# Patient Record
Sex: MALE | Race: WHITE | NOT HISPANIC OR LATINO | URBAN - METROPOLITAN AREA
[De-identification: names, ages, dates, MRNs, and addresses within clinical notes are randomized per-mention and may not be internally consistent; named-entity substitution may affect disease eponyms.]

---

## 2018-06-04 ENCOUNTER — EMERGENCY (EMERGENCY)
Facility: HOSPITAL | Age: 23
LOS: 0 days | Discharge: HOME | End: 2018-06-05
Attending: EMERGENCY MEDICINE | Admitting: EMERGENCY MEDICINE

## 2018-06-04 VITALS
SYSTOLIC BLOOD PRESSURE: 136 MMHG | RESPIRATION RATE: 18 BRPM | OXYGEN SATURATION: 99 % | TEMPERATURE: 97 F | HEART RATE: 95 BPM | HEIGHT: 67 IN | WEIGHT: 160.06 LBS | DIASTOLIC BLOOD PRESSURE: 80 MMHG

## 2018-06-04 DIAGNOSIS — S61.213A LACERATION WITHOUT FOREIGN BODY OF LEFT MIDDLE FINGER WITHOUT DAMAGE TO NAIL, INITIAL ENCOUNTER: ICD-10-CM
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
## 2018-06-04 DIAGNOSIS — Z79.899 OTHER LONG TERM (CURRENT) DRUG THERAPY: ICD-10-CM

## 2018-06-04 DIAGNOSIS — S61.211A LACERATION WITHOUT FOREIGN BODY OF LEFT INDEX FINGER WITHOUT DAMAGE TO NAIL, INITIAL ENCOUNTER: ICD-10-CM
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
## 2018-06-04 RX ORDER — SERTRALINE 25 MG/1
0 TABLET, FILM COATED ORAL
Qty: 0 | Refills: 0 | COMMUNITY

## 2018-06-04 RX ORDER — CEPHALEXIN 500 MG
500 CAPSULE ORAL ONCE
Qty: 0 | Refills: 0 | Status: COMPLETED | OUTPATIENT
Start: 2018-06-04 | End: 2018-06-04

## 2018-06-04 NOTE — ED PROVIDER NOTE - MEDICAL DECISION MAKING DETAILS
pt with finger lacerations.  Pt is UTD with tetanus.  pt given abx.  pt dc with outpatient follow up.  lacerations repaired.  Motor/sensation intact.

## 2018-06-04 NOTE — ED PROVIDER NOTE - OBJECTIVE STATEMENT
21 yo M with no significant PMHx up to date with tetanus presents to the ED c/o laceration to left 2nd and 3rd fingers. Pt was at work today using  and accidentally cut his fingers. Pt washed hands and cleaned with alcohol immediately. Pt denies numbness, weakness, and other areas of injury.

## 2018-06-04 NOTE — ED PROVIDER NOTE - PHYSICAL EXAMINATION
VITAL SIGNS: I have reviewed nursing notes and confirm.  CONSTITUTIONAL: Well-developed; well-nourished; in no acute distress.  SKIN: Skin exam is warm and dry, no acute rash.  HEAD: Normocephalic; atraumatic.  EXT: Normal ROM. No clubbing, cyanosis or edema. (+) small laceration to 2nd right PIP joint without active bleeding or FB. (+)small laceration to left 3rd PIP joint without active bleeding or FB. FROM. Sensation intact. Strength 5/5. CR < 2 seconds.   NEURO: Alert, oriented. Grossly unremarkable. No focal deficits.

## 2018-06-04 NOTE — ED PROVIDER NOTE - ATTENDING CONTRIBUTION TO CARE
23 yo m UTD with tetanus presents with left 2nd and 3rd finger laceration.  pt was cut with boxcutter while doing work.  the  slipped.  no numbness, no weakness, no other compalints.  awake, alert.  LUE: a cm laceration over 2nd and 3rd dorsal aspect of fingers near PIP; rom of fingers intact, no fb seen, flexion/extension intact at  2nd/3rd mcp and PIP joints against resistance, motor/sensation intact, no evidence of tendon laceration.  p:  irrigate, lac repair, dc with outpatient follow up.  will also give abx as the knife was dirty and a work knife.

## 2018-06-05 RX ORDER — CEPHALEXIN 500 MG
1 CAPSULE ORAL
Qty: 15 | Refills: 0 | OUTPATIENT
Start: 2018-06-05 | End: 2018-06-09

## 2018-06-05 RX ADMIN — Medication 500 MILLIGRAM(S): at 00:03

## 2018-06-07 ENCOUNTER — EMERGENCY (EMERGENCY)
Facility: HOSPITAL | Age: 23
LOS: 0 days | Discharge: HOME | End: 2018-06-07
Attending: EMERGENCY MEDICINE | Admitting: EMERGENCY MEDICINE

## 2018-06-07 VITALS
OXYGEN SATURATION: 96 % | RESPIRATION RATE: 18 BRPM | DIASTOLIC BLOOD PRESSURE: 68 MMHG | TEMPERATURE: 97 F | HEART RATE: 87 BPM | SYSTOLIC BLOOD PRESSURE: 130 MMHG

## 2018-06-07 VITALS
DIASTOLIC BLOOD PRESSURE: 68 MMHG | WEIGHT: 164.91 LBS | TEMPERATURE: 97 F | HEART RATE: 106 BPM | HEIGHT: 67 IN | OXYGEN SATURATION: 100 % | SYSTOLIC BLOOD PRESSURE: 130 MMHG | RESPIRATION RATE: 18 BRPM
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
## 2018-06-07 DIAGNOSIS — T81.4XXA INFECTION FOLLOWING A PROCEDURE, INITIAL ENCOUNTER: ICD-10-CM

## 2018-06-07 DIAGNOSIS — Z79.899 OTHER LONG TERM (CURRENT) DRUG THERAPY: ICD-10-CM

## 2018-06-07 DIAGNOSIS — M79.645 PAIN IN LEFT FINGER(S): ICD-10-CM
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
## 2018-06-07 LAB
ANION GAP SERPL CALC-SCNC: 14 MMOL/L — SIGNIFICANT CHANGE UP (ref 7–14)
BUN SERPL-MCNC: 16 MG/DL — SIGNIFICANT CHANGE UP (ref 10–20)
CALCIUM SERPL-MCNC: 10 MG/DL — SIGNIFICANT CHANGE UP (ref 8.5–10.1)
CHLORIDE SERPL-SCNC: 99 MMOL/L — SIGNIFICANT CHANGE UP (ref 98–110)
CO2 SERPL-SCNC: 27 MMOL/L — SIGNIFICANT CHANGE UP (ref 17–32)
CREAT SERPL-MCNC: 0.9 MG/DL — SIGNIFICANT CHANGE UP (ref 0.7–1.5)
GLUCOSE SERPL-MCNC: 102 MG/DL — HIGH (ref 70–99)
HCT VFR BLD CALC: 43.3 % — SIGNIFICANT CHANGE UP (ref 42–52)
HGB BLD-MCNC: 14.6 G/DL — SIGNIFICANT CHANGE UP (ref 14–18)
MCHC RBC-ENTMCNC: 29 PG — SIGNIFICANT CHANGE UP (ref 27–31)
MCHC RBC-ENTMCNC: 33.7 G/DL — SIGNIFICANT CHANGE UP (ref 32–37)
MCV RBC AUTO: 86.1 FL — SIGNIFICANT CHANGE UP (ref 80–94)
NRBC # BLD: 0 /100 WBCS — SIGNIFICANT CHANGE UP (ref 0–0)
PLATELET # BLD AUTO: 241 K/UL — SIGNIFICANT CHANGE UP (ref 130–400)
POTASSIUM SERPL-MCNC: 4.5 MMOL/L — SIGNIFICANT CHANGE UP (ref 3.5–5)
POTASSIUM SERPL-SCNC: 4.5 MMOL/L — SIGNIFICANT CHANGE UP (ref 3.5–5)
RBC # BLD: 5.03 M/UL — SIGNIFICANT CHANGE UP (ref 4.7–6.1)
RBC # FLD: 12.4 % — SIGNIFICANT CHANGE UP (ref 11.5–14.5)
SODIUM SERPL-SCNC: 140 MMOL/L — SIGNIFICANT CHANGE UP (ref 135–146)
WBC # BLD: 10.16 K/UL — SIGNIFICANT CHANGE UP (ref 4.8–10.8)
WBC # FLD AUTO: 10.16 K/UL — SIGNIFICANT CHANGE UP (ref 4.8–10.8)

## 2018-06-07 RX ORDER — AMPICILLIN SODIUM AND SULBACTAM SODIUM 250; 125 MG/ML; MG/ML
3 INJECTION, POWDER, FOR SUSPENSION INTRAMUSCULAR; INTRAVENOUS ONCE
Qty: 0 | Refills: 0 | Status: COMPLETED | OUTPATIENT
Start: 2018-06-07 | End: 2018-06-07

## 2018-06-07 RX ORDER — AZTREONAM 2 G
1 VIAL (EA) INJECTION
Qty: 14 | Refills: 0 | OUTPATIENT
Start: 2018-06-07 | End: 2018-06-13

## 2018-06-07 RX ORDER — IBUPROFEN 200 MG
600 TABLET ORAL ONCE
Qty: 0 | Refills: 0 | Status: COMPLETED | OUTPATIENT
Start: 2018-06-07 | End: 2018-06-07

## 2018-06-07 RX ADMIN — Medication 600 MILLIGRAM(S): at 09:48

## 2018-06-07 RX ADMIN — AMPICILLIN SODIUM AND SULBACTAM SODIUM 200 GRAM(S): 250; 125 INJECTION, POWDER, FOR SUSPENSION INTRAMUSCULAR; INTRAVENOUS at 10:49

## 2018-06-07 NOTE — CONSULT NOTE ADULT - ASSESSMENT
23 yo m with finger laceration    -remove sutures  - warm soaks tid  - continue antibiotics  - follow up with Dr Violetta yan in the office  -D/d Dr Shipley

## 2018-06-07 NOTE — CONSULT NOTE ADULT - SUBJECTIVE AND OBJECTIVE BOX
MATILDE MAY [id number]  22y Male    HPI:  23yo male sp laceration to 3rd and 2th finger 2 days ago with utility knife, washed out and sutured by ed. now comes in with pain, redness, swelling over 3rd finger laceration . no fever    PAST MEDICAL & SURGICAL HISTORY:  Anxiety        MEDICATIONS  (STANDING):  zoloft  MEDICATIONS  (PRN):      Allergies    No Known Allergies    Intolerances        REVIEW OF SYSTEMS    [ ] A ten-point review of systems was otherwise negative except as noted.        Vital Signs Last 24 Hrs  T(C): 36.2 (07 Jun 2018 14:24), Max: 36.2 (07 Jun 2018 09:39)  T(F): 97.2 (07 Jun 2018 14:24), Max: 97.2 (07 Jun 2018 09:39)  HR: 87 (07 Jun 2018 14:24) (87 - 106)  BP: 130/68 (07 Jun 2018 14:24) (130/68 - 130/68)  BP(mean): --  RR: 18 (07 Jun 2018 14:24) (18 - 18)  SpO2: 96% (07 Jun 2018 14:24) (96% - 100%)    PHYSICAL EXAM:  hand: 3rd finger laceration with redness, swelling, tender over extensor tendon and with passive extension, no drainage     LABS:  Labs:  CAPILLARY BLOOD GLUCOSE                              14.6   10.16 )-----------( 241      ( 07 Jun 2018 11:03 )             43.3         06-07    140  |  99  |  16  ----------------------------<  102<H>  4.5   |  27  |  0.9      Calcium, Total Serum: 10.0 mg/dL (06-07-18 @ 11:03)      LFTs:         Coags:                RADIOLOGY & ADDITIONAL STUDIES:  < from: Xray Hand 3 Views, Left (06.07.18 @ 10:08) >  INTERPRETATION:  Clinical History / Reason for exam: Pain  3. Images  There is soft tissue swelling of the third finger.  No fracture or dislocation is seen.  Impression soft tissue swelling    < end of copied text >

## 2018-06-07 NOTE — ED PROVIDER NOTE - NS ED ROS FT
Eyes:  No visual changes  ENMT:  No neck pain or stiffness.  Cardiac:  No chest pain, SOB  Respiratory:  No cough   GI:  No nausea, vomiting, diarrhea or abdominal pain.  :  No dysuria  MS:  No back pain.  Neuro:  No headache or weakness.  No LOC.  Skin:  No skin rash.   Endocrine: No history of thyroid disease or diabetes.

## 2018-06-07 NOTE — ED PROVIDER NOTE - OBJECTIVE STATEMENT
23 yo m tara dennis presents to ed with pain and swelling over a laceration on 3rd digit extensor surface that was caused by a alexander . Pt cut hand 2 days ago, was cleaned and sutured in ED and started on PO keflex. Denies fever, chills, rash, headache, dizziness, loc.

## 2018-06-07 NOTE — ED PROVIDER NOTE - PHYSICAL EXAMINATION
CONSTITUTIONAL: Well-developed; well-nourished; in no acute distress.   SKIN: warm, dry  HEAD: Normocephalic; atraumatic.  EYES: no conj injection  ENT: No nasal discharge; airway clear.  CARD: S1, S2 normal; no murmurs, gallops, or rubs. Regular rate and rhythm.   RESP: No wheezes, rales or rhonchi.  EXT: 2+ distal pulses, nl cap refill. swelling and erythema over dip of third digit dorsal surface. able to extend and flex finger but limited due to pain and swelling. 2 sutures in place, no drainage, no dehiscence of wound, no fluctuance.   NEURO: Alert, oriented, grossly unremarkable  PSYCH: Cooperative, appropriate.

## 2018-06-07 NOTE — ED PROVIDER NOTE - ATTENDING CONTRIBUTION TO CARE
I personally evaluated the patient. I reviewed the Resident’s or Physician Assistant’s note (as assigned above), and agree with the findings and plan except as documented in my note.  pt with wound infection, possible early extensor tenovitis, NVI, sutures to 3rd PIP removed, hand soaked, abx given, seen in ED by Dr Shipley who will follow in office. Patient counseled regarding conditions which should prompt return.

## 2019-07-08 ENCOUNTER — EMERGENCY (EMERGENCY)
Facility: HOSPITAL | Age: 24
LOS: 1 days | End: 2019-07-08
Attending: EMERGENCY MEDICINE
Payer: MEDICAID

## 2019-07-08 VITALS
HEIGHT: 67 IN | SYSTOLIC BLOOD PRESSURE: 161 MMHG | WEIGHT: 160.06 LBS | DIASTOLIC BLOOD PRESSURE: 85 MMHG | HEART RATE: 94 BPM | OXYGEN SATURATION: 96 % | RESPIRATION RATE: 20 BRPM | TEMPERATURE: 99 F

## 2019-07-08 PROCEDURE — 99282 EMERGENCY DEPT VISIT SF MDM: CPT

## 2019-07-08 NOTE — ED ADULT NURSE NOTE - NSIMPLEMENTINTERV_GEN_ALL_ED
Implemented All Universal Safety Interventions:  Roberts to call system. Call bell, personal items and telephone within reach. Instruct patient to call for assistance. Room bathroom lighting operational. Non-slip footwear when patient is off stretcher. Physically safe environment: no spills, clutter or unnecessary equipment. Stretcher in lowest position, wheels locked, appropriate side rails in place.

## 2019-07-09 ENCOUNTER — EMERGENCY (EMERGENCY)
Facility: HOSPITAL | Age: 24
LOS: 0 days | Discharge: HOME | End: 2019-07-09
Admitting: EMERGENCY MEDICINE
Payer: MEDICAID

## 2019-07-09 VITALS
HEIGHT: 67 IN | WEIGHT: 160.06 LBS | OXYGEN SATURATION: 100 % | HEART RATE: 94 BPM | SYSTOLIC BLOOD PRESSURE: 142 MMHG | DIASTOLIC BLOOD PRESSURE: 72 MMHG | TEMPERATURE: 98 F | RESPIRATION RATE: 16 BRPM

## 2019-07-09 DIAGNOSIS — N50.811 RIGHT TESTICULAR PAIN: ICD-10-CM

## 2019-07-09 DIAGNOSIS — M54.5 LOW BACK PAIN: ICD-10-CM

## 2019-07-09 DIAGNOSIS — R11.0 NAUSEA: ICD-10-CM

## 2019-07-09 DIAGNOSIS — N50.819 TESTICULAR PAIN, UNSPECIFIED: ICD-10-CM

## 2019-07-09 LAB
APPEARANCE UR: CLEAR — SIGNIFICANT CHANGE UP
BILIRUB UR-MCNC: NEGATIVE — SIGNIFICANT CHANGE UP
C TRACH RRNA SPEC QL NAA+PROBE: SIGNIFICANT CHANGE UP
COLOR SPEC: YELLOW — SIGNIFICANT CHANGE UP
DIFF PNL FLD: NEGATIVE — SIGNIFICANT CHANGE UP
GLUCOSE UR QL: NEGATIVE MG/DL — SIGNIFICANT CHANGE UP
KETONES UR-MCNC: NEGATIVE — SIGNIFICANT CHANGE UP
LEUKOCYTE ESTERASE UR-ACNC: NEGATIVE — SIGNIFICANT CHANGE UP
N GONORRHOEA RRNA SPEC QL NAA+PROBE: SIGNIFICANT CHANGE UP
NITRITE UR-MCNC: NEGATIVE — SIGNIFICANT CHANGE UP
PH UR: 6 — SIGNIFICANT CHANGE UP (ref 5–8)
PROT UR-MCNC: NEGATIVE MG/DL — SIGNIFICANT CHANGE UP
SP GR SPEC: 1.01 — SIGNIFICANT CHANGE UP (ref 1.01–1.03)
SPECIMEN SOURCE: SIGNIFICANT CHANGE UP
UROBILINOGEN FLD QL: 0.2 MG/DL — SIGNIFICANT CHANGE UP (ref 0.2–0.2)

## 2019-07-09 PROCEDURE — 76870 US EXAM SCROTUM: CPT | Mod: 26

## 2019-07-09 PROCEDURE — 99284 EMERGENCY DEPT VISIT MOD MDM: CPT

## 2019-07-09 NOTE — ED PROVIDER NOTE - MUSCULOSKELETAL, MLM
no midline ttp, palpable stepoff, deformity, ecchymosis, or fluctuance to c/t/l spine. no back tenderness.

## 2019-07-09 NOTE — ED ADULT NURSE NOTE - CHIEF COMPLAINT QUOTE
Pt complains of lower back and right sided testicular pain starting 3 hours ago, denies any dysuria or incontinence

## 2019-07-09 NOTE — ED PROVIDER NOTE - CARE PROVIDER_API CALL
Stanton Zarate)  Urology  [address] [place] [address], [address]  [place] [address]  Phone: [phone]  Fax: [phone]  Follow Up Time: 1-3 Days

## 2019-07-09 NOTE — ED PROVIDER NOTE - CLINICAL SUMMARY MEDICAL DECISION MAKING FREE TEXT BOX
Offered transfer to [facility] for testic us concern for torsion.  Pt declines. Prefers to drive. Understands that delay in treatment of torsion can cause permanent disability, loss of testicle and fertility as well as pain. The patient has decided to leave against medical advice.  The patient is AAOx3, not intoxicated, and displays normal decision making ability. We discussed all risks, benefits, and alternatives to the progression of treatment and the potential dangers of leaving including but not limited to permanent disability, injury, and death.  The patient was instructed that they are welcome to change their decision to leave against medical advice and return to the emergency department at any time and for any reason in order to allow us to render care.

## 2019-07-09 NOTE — ED PROVIDER NOTE - CARE PLAN
Principal Discharge DX:	Testicular pain, right Principal Discharge DX:	Testicular pain, right  Assessment and plan of treatment:	Left testicular pain. will require eval for torsion. discussed tx to [facility] for urgent eval. Pt declining. Prefers to drive. See below for further discussion.

## 2019-07-09 NOTE — ED PROVIDER NOTE - NS ED ROS FT
Review of Systems    Constitutional: (-) fever   Eyes/ENT: (-) vision changes  Cardiovascular: (-) chest pain, (-) syncope (-) palpitations  Respiratory: (-) cough, (-) shortness of breath  Gastrointestinal: (-) vomiting, (-) diarrhea (-) abdominal pain  Genitourinary:  (-) dysuria +testicular pain  Musculoskeletal: (-) neck pain, (+) back pain, (-) leg pain/swelling  Integumentary: (-) rash, (-) edema  Neurological: (-) headache  Allergic/Immunologic: (-) pruritus

## 2019-07-09 NOTE — ED PROVIDER NOTE - PLAN OF CARE
Left testicular pain. will require eval for torsion. [facility]. Pt declining. Prefers to drive. See below for further discussion.

## 2019-07-09 NOTE — ED PROVIDER NOTE - OBJECTIVE STATEMENT
22 y/o M  without PMH presents with wife for R testicular pain x 4 hrs after work. denies lifting especially heavy or feeling pain immediately after work. He presented to [facility] for eval, but was told needed a testicular US to r/o torsion which necessitated transfer to the [facility], he amaed and presented here by personal vehicle instead of by ambulance. no discharge. +nausea earlier. no vomiting. no abdominal pain, but does relate some paralumbar back pain. no cp/sob/dysuria/hematuria/fever/concern for STDs/sex with new or more than 1 partner/skin changes/trauma/hx kidney stones or issues/hx STDs/drug use. does relate that had a similar episode 1 wk ago which resolved when pt woke up from sleep. no scrotal swelling. 24 y/o M  without PMH presents with wife for R testicular pain x 4 hrs after work. denies lifting especially heavy or feeling pain immediately after work. He presented to [facility] for eval, but was told needed a testicular US to r/o torsion which necessitated transfer to the [facility], he amaed and presented here by personal vehicle instead of by ambulance. no discharge. +nausea earlier. no vomiting. no abdominal pain, but does relate some paralumbar back pain which has been present x days. no cp/sob/dysuria/hematuria/fever/concern for STDs/sex with new or more than 1 partner/skin changes/trauma/hx kidney stones or issues/hx STDs/drug use. does relate that had a similar episode 1 wk ago which resolved when pt woke up from sleep. no scrotal swelling. denies saddle anesthesia, bowel/bladder dysfunction, difficulty ambulating, paraesthesias, direct trauma, hx IVDA, recent injections, weakness, hx back surgeries, unexplained wt loss.

## 2019-07-09 NOTE — ED PROVIDER NOTE - OBJECTIVE STATEMENT
22yo M acute onset severe pulling right testicular and band like lower back pain on return home from work tonight. No dysuria, no trauma, no discharge. Occurred last week for 2 hours then resolved without intervention. Pt did not take meds for pain. Severe pain resolved en route to ED. Now describing soreness. non radiating. no n/v. No prior  hx.

## 2019-07-09 NOTE — ED PROVIDER NOTE - PHYSICAL EXAMINATION
PHYSICAL EXAM:    GENERAL: Alert, appears stated age, well appearing, non-toxic  SKIN: Warm, pink and dry. MMM.   EYE: Normal lids/conjunctiva  ENT: Normal hearing, patent oropharynx   Pulm: Bilateral BS, normal resp effort, no wheezes, stridor, or retractions  CV: RRR, no M/R/G, 2+and = radial pulses  Abd: soft, non-tender, non-distended. no CVA tenderness. no rebound/guarding.   : external genitalia with R testicular high lie. no lesions. testicles descended bilaterally. +cremasteric reflexes bilaterally. no hernias on valsalva. R testicular TTP. no discharge. Chaperoned by PCA  Mskel: no erythema, cyanosis, edema. no calf tenderness. no spinal TTP.   Neuro: AAOx3, 5/5 strength throughout. normal gait. PHYSICAL EXAM:    GENERAL: Alert, appears stated age, well appearing, non-toxic  SKIN: Warm, pink and dry. MMM.   EYE: Normal lids/conjunctiva  ENT: Normal hearing, patent oropharynx   Pulm: Bilateral BS, normal resp effort, no wheezes, stridor, or retractions  CV: RRR, no M/R/G, 2+and = radial pulses  Abd: soft, non-tender, non-distended. no CVA tenderness. no rebound/guarding.   : external genitalia with R scrotum with high lie, pt relates this is typical for him. no lesions. testicles descended bilaterally. +cremasteric reflexes bilaterally. no hernias on valsalva. R testicular TTP. no discharge. Chaperoned by PCA  Mskel: no erythema, cyanosis, edema. no calf tenderness. no spinal TTP.   Neuro: AAOx3, 5/5 strength throughout. normal gait.

## 2019-07-09 NOTE — ED PROVIDER NOTE - NSFOLLOWUPCLINICS_GEN_ALL_ED_FT
A Family Medicine Doctor  Family Medicine  .  [place]   Phone:   Fax:   Follow Up Time: 1-3 Days    [address]   Phone: [phone]  Fax:   Follow Up Time: 1-3 Days

## 2019-07-09 NOTE — ED PROVIDER NOTE - NSFOLLOWUPINSTRUCTIONS_ED_ALL_ED_FT
WHAT YOU NEED TO KNOW:    What do I need to know about scrotal pain? Scrotal pain can happen at any age. The cause of scrotal pain can range from a minor injury to a serious medical condition. It is very important to seek immediate care if you have scrotal pain. The pain may be a warning sign of a serious condition that will need treatment. Without immediate care, you may be at increased risk for losing a testicle or being sterile (not having children).    What may cause scrotal pain?     Torsion (twisting) of the testicle, cord that carries sperm from the testicle, or tissue attached to the testicle      An infection of the testicle or other area in the scrotum      A hydrocele (fluid buildup around the testicle) or varicocele (blood backup in veins in the scrotum)      An inguinal hernia (tissue pushed out of place in your groin)      Jacki gangrene (tissue death of the area between the scrotum and anus)      A urinary tract infection or stone that is passing, or an infected appendix      An injury in your groin or scrotum    What are the warning signs of a serious medical problem? Seek care immediately if you have any of the following:     Pain that starts suddenly or is severe      Swelling in your scrotum or groin, especially if you also have severe pain or are vomiting      Red or black patches of skin on your scrotum or area between your penis and anus      Blisters anywhere in your groin or scrotum      A fever    How is the cause of scrotal pain diagnosed? Your healthcare provider will examine you and ask about your pain. Tell the provider when the pain started and how long it lasts. Your provider will ask if pain started in another area and moved to your scrotum. The pain may also have moved from your scrotum to another area. Tell your provider if you have pain during exercise or if you had an injury to your groin. Also tell your provider if you have any problems urinating or if any discharge came out of your penis. Your provider may also ask about your sexual activity.    Blood tests may be used to check for signs of infection.      Ultrasound pictures may show a problem with your testicles or tissues in your scrotum. An ultrasound may also show kidney stones or other problems that may be causing your pain.    How is scrotal pain treated? Treatment will depend on the cause of your pain:    Prescription pain medicine may be given. Ask your healthcare provider how to take this medicine safely. Some prescription pain medicines contain acetaminophen. Do not take other medicines that contain acetaminophen without talking to your healthcare provider. Too much acetaminophen may cause liver damage. Prescription pain medicine may cause constipation. Ask your healthcare provider how to prevent or treat constipation.       NSAIDs, such as ibuprofen, help decrease swelling, pain, and fever. This medicine is available with or without a doctor's order. NSAIDs can cause stomach bleeding or kidney problems in certain people. If you take blood thinner medicine, always ask your healthcare provider if NSAIDs are safe for you. Always read the medicine label and follow directions.      Antibiotics are used to treat a bacterial infection.      Surgery may be needed to untwist the testicle, or cord, or to remove dead or infected tissue.     What can I do to manage my symptoms?     Wear a support device, if directed. A support device, such as a jock strap, can help keep your scrotum lifted and supported. This can help decrease pain.      Apply ice to your scrotum. Ice helps decrease pain and swelling. Use an ice pack, or put crushed ice in a plastic bag. Cover the pack or bag with a towel before you apply it to your scrotum. Apply ice for 15 to 20 minutes every hour, or as directed.    When should I seek immediate care?     You have any warning signs of a serious problem.      You have pain or swelling that starts or gets worse quickly.      You have skin changes in your scrotum, such as a dark patch.      You have a fever.    When should I contact my healthcare provider?     Your pain does not get better, even after you take pain medicine.      You have new or worsening pain.      You have questions or concerns about your condition or care.    CARE AGREEMENT:    You have the right to help plan your care. Learn about your health condition and how it may be treated. Discuss treatment options with your healthcare providers to decide what care you want to receive. You always have the right to refuse treatment.

## 2019-07-09 NOTE — ED PROVIDER NOTE - CLINICAL SUMMARY MEDICAL DECISION MAKING FREE TEXT BOX
24 y/o M  without PMH presents for R testicular pain x 4 hrs after work. testicular exam normal except for high riding scrotum on R side, with some mild testicular TTP. +cremasteric reflexes b/l. testicular US without acute pathology. sent G/C. abdominal exam benign and pt denies abdominal pain. pt does report some back pain, but it has been present x days and he does get this sometimes. no spinal TTP or red flags. urine clean without signs of infection or blood. pt denies pain on discharge from ED. no suspicion of anything emergent at this time or indication for further testing emergently. pt given strict return precautions. Reviewed all results and necessity for follow up. Counseled on red flags and to return for them.  Patient appears well on discharge.

## 2019-07-10 LAB
CULTURE RESULTS: SIGNIFICANT CHANGE UP
SPECIMEN SOURCE: SIGNIFICANT CHANGE UP

## 2019-07-13 DIAGNOSIS — M54.5 LOW BACK PAIN: ICD-10-CM

## 2019-07-13 DIAGNOSIS — N50.811 RIGHT TESTICULAR PAIN: ICD-10-CM

## 2019-07-13 DIAGNOSIS — Z79.899 OTHER LONG TERM (CURRENT) DRUG THERAPY: ICD-10-CM

## 2019-07-13 DIAGNOSIS — F41.9 ANXIETY DISORDER, UNSPECIFIED: ICD-10-CM

## 2020-07-24 NOTE — ED PROVIDER NOTE - CROS ED CONS ALL NEG
07/24/20 1400   Over the last 2 weeks, how often have you been bothered by any of the following problems?   Little interest or pleasure in doing things   (pt too symptomatic to assess)     SNOW EncisoW     negative...

## 2021-05-09 ENCOUNTER — EMERGENCY (EMERGENCY)
Facility: HOSPITAL | Age: 26
LOS: 0 days | Discharge: HOME | End: 2021-05-09
Attending: EMERGENCY MEDICINE | Admitting: EMERGENCY MEDICINE
Payer: MEDICAID

## 2021-05-09 VITALS
TEMPERATURE: 99 F | OXYGEN SATURATION: 100 % | HEART RATE: 91 BPM | SYSTOLIC BLOOD PRESSURE: 144 MMHG | WEIGHT: 164.91 LBS | HEIGHT: 67 IN | RESPIRATION RATE: 18 BRPM | DIASTOLIC BLOOD PRESSURE: 68 MMHG

## 2021-05-09 DIAGNOSIS — R07.89 OTHER CHEST PAIN: ICD-10-CM

## 2021-05-09 PROCEDURE — 71046 X-RAY EXAM CHEST 2 VIEWS: CPT | Mod: 26

## 2021-05-09 PROCEDURE — 99284 EMERGENCY DEPT VISIT MOD MDM: CPT

## 2021-05-09 RX ORDER — FAMOTIDINE 10 MG/ML
1 INJECTION INTRAVENOUS
Qty: 7 | Refills: 0
Start: 2021-05-09 | End: 2021-05-15

## 2021-05-09 RX ORDER — FAMOTIDINE 10 MG/ML
20 INJECTION INTRAVENOUS ONCE
Refills: 0 | Status: COMPLETED | OUTPATIENT
Start: 2021-05-09 | End: 2021-05-09

## 2021-05-09 RX ADMIN — FAMOTIDINE 20 MILLIGRAM(S): 10 INJECTION INTRAVENOUS at 17:13

## 2021-05-09 NOTE — ED PROVIDER NOTE - ATTENDING CONTRIBUTION TO CARE
25yr old male here for eval of midsternal chest pain. pt reports midsternal pain, worse after eating. pt states feels different thatn his GERD. no fever, chills, sob, back pain. no drug use. CON: ao x 3, HENMT: c neck supple,  CV: s1s2, RESP: cta b/l, GI:  soft, nontender, no rebound, no guarding, SKIN: no rash, MSK: no deformities, NEURO: no gross motor or sensory deficit Psychiatric: appropriate mood, appropriate affect    impression chest pain, possible GI etiology, bedside echo, ekg, and cxray wnl. outpt f/u. given Gi meds and f/u instructions.

## 2021-05-09 NOTE — ED ADULT TRIAGE NOTE - CHIEF COMPLAINT QUOTE
"I have chest pain that does not feel like acid reflux. I went to urgent care and got an EKG, they told me it's probably acid reflux but it feels different to me".

## 2021-05-09 NOTE — ED PROVIDER NOTE - CARE PROVIDER_API CALL
Samuel Grant  CARDIOVASCULAR DISEASE  [address] [place] [id number]  Phone: [phone]  Fax: [phone]  Follow Up Time: 1-3 Days

## 2021-05-09 NOTE — ED PROVIDER NOTE - PATIENT PORTAL LINK FT
You can access the FollowMyHealth Patient Portal offered by [facility] by registering at the following website: [email or web address]. By joining [employer]’s FollowMyHealth portal, you will also be able to view your health information using other applications (apps) compatible with our system.

## 2021-05-09 NOTE — ED PROVIDER NOTE - OBJECTIVE STATEMENT
24 yo male, no pmh, no FH, presents to ed for cp, started this am, midsternal, mild, aching, no radiation. denies fever, chills, sob, le swelling, abd pain, nvd.

## 2021-05-09 NOTE — ED PROVIDER NOTE - CLINICAL SUMMARY MEDICAL DECISION MAKING FREE TEXT BOX
chest pain, possible GI etiology, bedside echo, ekg, and cxray wnl. outpt f/u. given Gi meds and f/u instructions.

## 2021-09-13 ENCOUNTER — OUTPATIENT (OUTPATIENT)
Dept: OUTPATIENT SERVICES | Facility: HOSPITAL | Age: 26
LOS: 1 days | Discharge: HOME | End: 2021-09-13

## 2021-09-13 DIAGNOSIS — Z11.59 ENCOUNTER FOR SCREENING FOR OTHER VIRAL DISEASES: ICD-10-CM

## 2021-09-30 ENCOUNTER — OUTPATIENT (OUTPATIENT)
Dept: OUTPATIENT SERVICES | Facility: HOSPITAL | Age: 26
LOS: 1 days | Discharge: HOME | End: 2021-09-30

## 2021-09-30 DIAGNOSIS — Z20.828 CONTACT WITH AND (SUSPECTED) EXPOSURE TO OTHER VIRAL COMMUNICABLE DISEASES: ICD-10-CM

## 2021-09-30 LAB — SARS-COV-2 RNA SPEC QL NAA+PROBE: SIGNIFICANT CHANGE UP

## 2022-08-10 NOTE — ED ADULT TRIAGE NOTE - ACCOMPANIED BY
Spouse/Significant other Ivermectin Pregnancy And Lactation Text: This medication is Pregnancy Category C and it isn't known if it is safe during pregnancy. It is also excreted in breast milk.

## 2023-12-27 NOTE — ED ADULT TRIAGE NOTE - CHIEF COMPLAINT QUOTE
Pt order for Mammogram was placed already for pt. Pt was able to scx herself on today./Willw   Pt complains of lower back and right sided testicular pain starting 3 hours ago, denies any dysuria or incontinence

## 2024-02-20 RX ORDER — SERTRALINE 25 MG/1
1 TABLET, FILM COATED ORAL
Qty: 0 | Refills: 0 | DISCHARGE

## 2024-02-21 PROBLEM — F41.9 ANXIETY DISORDER, UNSPECIFIED: Chronic | Status: ACTIVE | Noted: 2018-06-07

## 2024-10-11 NOTE — ED ADULT NURSE NOTE - NS ED PATIENT SAFETY CONCERN
"  [facility] History and Physical  Martínez Flores  MRN: [id number]  Age: 26 y.o. male   YOB: 1997      SUBJECTIVE  History of Presenting Illness     Patient is a 26 y.o. male [place]-speaking male with no pertinent past medical history who presented initially on 9/27/2024 with a left gluteal abscess.  At that visit, it became apparent that he had multiple scars, and old sinus tracts, concerning for possible hidradenitis.  He did undergo incision and debridement of the largest abscess pocket, and presents today for follow-up.  He has had no further issues, has no new complaints, and reports the wound is healed well.          Review of Systems   He denies lightheadedness, dizziness, fainting, passing out, headache, nausea, vomiting, chest pain, palpitations, shortness of breath, coughing, wheezing, heart burn, reflux, skin lesions, unintended weight loss/gain, sensitivity to heat/cold, changes in sensation, changes in vision/hearing/smell/taste, myalgias, arthralgias, and has no other acute complaints or concerning symptoms to report at this time.              Physical Examination     Vitals:    10/11/24 0955   BP: 134/83   Pulse: 56   SpO2: 98%   Weight: 66.7 kg (147 lb)   Height: 154.9 cm (61\")       Estimated body mass index is 27.78 kg/m² as calculated from the following:    Height as of this encounter: 154.9 cm (61\").    Weight as of this encounter: 66.7 kg (147 lb).  PREVIOUS WEIGHTS:   Wt Readings from Last 5 Encounters:   10/11/24 66.7 kg (147 lb)   09/27/24 66.7 kg (147 lb)       Physical Examination:  Gen: awake, alert, sitting up in chair in no acute distress  HEENT: NCAT, EOMI, anicteric sclerae  CV: RRR, normotensive  Resp: nonlabored on room air, sats appropriate  Abd: soft, nontender, nondistended   : Site of I&D is healing well, wound is very shallow, has healthy granulation tissue in the base, several areas appear to be small previously drained punctum's, area " of skin excoriation amongst them  MSK: moves all four, no c/c/e  Neuro: no focal deficits, cranial nerves grossly intact  Psy: Cooperative      Problem List     Patient Active Problem List   Diagnosis    Abscess, gluteal, left            Assessment/Plan   Martínez Flores is a 26 y.o. male with left gluteal abscess, and scarring, sinus tracts that are concerning to me for possible hidradenitis.  He has healed well from his I&D, has no need for surgical follow-up at this time.  I did  him and his family to establish care with a primary care provider, and that he may need follow-up with a dermatologist for hidradenitis care, should this recur.      Smoking cessation counseling: Non-smoker  BMI counseling: BMI is >= 25 and <30. (Overweight) -he is not amenable to discussion regarding weight loss, including dietary interventions and exercise, which were offered.  Med rec complete:yes  VTE: Not indicated    Time Spent: I spent 20 minutes caring for Martínez Flores on this date of service. This time includes time spent by me in the following activities: preparing for the clinic visit, reviewing tests, obtaining and/or reviewing a separately obtained history, performing a medically appropriate examination and/or evaluation, counseling and educating the patient/family/caregiver, ordering medications, tests, or procedures, and documenting information in the medical record.     Guillermo Rajput MD 10/11/2024 10:08 CDT   No